# Patient Record
Sex: MALE | Race: BLACK OR AFRICAN AMERICAN | NOT HISPANIC OR LATINO | URBAN - METROPOLITAN AREA
[De-identification: names, ages, dates, MRNs, and addresses within clinical notes are randomized per-mention and may not be internally consistent; named-entity substitution may affect disease eponyms.]

---
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
## 2021-02-15 NOTE — ED ADULT TRIAGE NOTE - CADM TRG TX PRIOR TO ARRIVAL
-- DO NOT REPLY / DO NOT REPLY ALL --  -- Message is from the [facility]--    Patient is requesting a medication refill - medication is on active medication list    Patient is currently OUT of the requested medication.    Was Medication Pended?  No.     Rx Name and Dose:  diclofenac (VOLTAREN) 1 % gel    Duration: 30 days    [id number] [address] [facility]  [address]    Patient confirmed the above pharmacy as correct?  Yes    Caller Information       Type Contact Phone    02/13/2021 01:38 PM CST Phone (Incoming) Gucci Amaral (Self) [phone] (M)          Alternative phone number: None    Turnaround time given to caller:   \"Your doctor's office is closed. This message will be sent to our nurse. They will return your call as soon as they review your message. (Calls after 10 PM will be returned tomorrow morning.)\"  
Unable to reach patient, please follow up with patient to advise on refill.  
see ambulance record

## 2022-08-14 NOTE — ED STATDOCS - MDM ORDERS SUBMITTED SELECTION
[address] [place] [address] At [place]  Location: [address] [id number]   Phone: [phone]         Help Hotline 279 716-1077 or 5-923.606.9868 or 211   24 hour crisis line for the following [address] Angelina Sumner    PEER WARM LINE: [phone]  Monday through Friday 4pm to 8pm and Saturday 1pm-3pm   You can call during these times to talk with a peer support person as needed        [place]:  [facility] [address] [phone] ($15/week)  [facility] MONTESINOS [phone] (women only)  [place] [phone] (HIV positive only)  [address] [phone] (medical illness only)  [facility] [phone] (mental illness only)  [facility] [phone] (women only domestic violence shelter)  [facility] [phone] (women only domestic violence shelter)  [employer] [phone] (women only)  [facility] Schlatter [phone] (women only $60-$75/week)  [address] [place] [address] [phone] (men only)  [address] [phone] (men only)  [facility] [phone] (men only, veterans)  [place] [address]:  [facility] [address] [phone]  [facility] [phone] (women only domestic violence shelter)    [place]:  Anibal [address] [phone]  Mikki Cotton [phone] (women only domestic violence shelter)   [facility] Concepcion [phone] (women only domestic violence shelter)    [facility]:  [address] [place] [address] [phone]    [address]:   [employer] [phone]  [address] [phone]
Labs/Medications